# Patient Record
Sex: FEMALE | Race: BLACK OR AFRICAN AMERICAN | Employment: FULL TIME | URBAN - NONMETROPOLITAN AREA
[De-identification: names, ages, dates, MRNs, and addresses within clinical notes are randomized per-mention and may not be internally consistent; named-entity substitution may affect disease eponyms.]

---

## 2022-05-04 ENCOUNTER — APPOINTMENT (OUTPATIENT)
Dept: GENERAL RADIOLOGY | Age: 37
End: 2022-05-04
Attending: EMERGENCY MEDICINE
Payer: MEDICAID

## 2022-05-04 ENCOUNTER — HOSPITAL ENCOUNTER (EMERGENCY)
Age: 37
Discharge: HOME | End: 2022-05-04
Attending: EMERGENCY MEDICINE
Payer: MEDICAID

## 2022-05-04 VITALS
RESPIRATION RATE: 16 BRPM | TEMPERATURE: 98.2 F | SYSTOLIC BLOOD PRESSURE: 133 MMHG | BODY MASS INDEX: 51.91 KG/M2 | WEIGHT: 293 LBS | HEIGHT: 63 IN | OXYGEN SATURATION: 100 % | DIASTOLIC BLOOD PRESSURE: 65 MMHG | HEART RATE: 70 BPM
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
## 2022-05-04 DIAGNOSIS — S01.81XA FACIAL LACERATION, INITIAL ENCOUNTER: ICD-10-CM

## 2022-05-04 DIAGNOSIS — S80.02XA CONTUSION OF LEFT KNEE, INITIAL ENCOUNTER: ICD-10-CM

## 2022-05-04 DIAGNOSIS — S60.221A CONTUSION OF RIGHT HAND, INITIAL ENCOUNTER: Primary | ICD-10-CM

## 2022-05-04 PROCEDURE — 74011000250 HC RX REV CODE- 250: Performed by: EMERGENCY MEDICINE

## 2022-05-04 PROCEDURE — 74011250637 HC RX REV CODE- 250/637: Performed by: EMERGENCY MEDICINE

## 2022-05-04 PROCEDURE — 73130 X-RAY EXAM OF HAND: CPT

## 2022-05-04 PROCEDURE — 90715 TDAP VACCINE 7 YRS/> IM: CPT | Performed by: EMERGENCY MEDICINE

## 2022-05-04 PROCEDURE — 99284 EMERGENCY DEPT VISIT MOD MDM: CPT

## 2022-05-04 PROCEDURE — 74011250636 HC RX REV CODE- 250/636: Performed by: EMERGENCY MEDICINE

## 2022-05-04 PROCEDURE — 75810000293 HC SIMP/SUPERF WND  RPR

## 2022-05-04 PROCEDURE — 73560 X-RAY EXAM OF KNEE 1 OR 2: CPT

## 2022-05-04 PROCEDURE — 90471 IMMUNIZATION ADMIN: CPT

## 2022-05-04 RX ORDER — ACETAMINOPHEN AND CODEINE PHOSPHATE 300; 30 MG/1; MG/1
1 TABLET ORAL
Qty: 14 TABLET | Refills: 0 | Status: SHIPPED | OUTPATIENT
Start: 2022-05-04 | End: 2022-05-11

## 2022-05-04 RX ORDER — LIDOCAINE HYDROCHLORIDE 10 MG/ML
10 INJECTION INFILTRATION; PERINEURAL ONCE
Status: COMPLETED | OUTPATIENT
Start: 2022-05-04 | End: 2022-05-04

## 2022-05-04 RX ORDER — HYDROCHLOROTHIAZIDE 25 MG/1
25 TABLET ORAL DAILY
COMMUNITY
Start: 2021-12-13

## 2022-05-04 RX ORDER — BACITRACIN 500 UNIT/G
1 PACKET (EA) TOPICAL
Status: COMPLETED | OUTPATIENT
Start: 2022-05-04 | End: 2022-05-04

## 2022-05-04 RX ORDER — FAMOTIDINE 20 MG/1
20 TABLET, FILM COATED ORAL 2 TIMES DAILY
COMMUNITY
Start: 2021-12-13

## 2022-05-04 RX ORDER — ACETAMINOPHEN AND CODEINE PHOSPHATE 300; 30 MG/1; MG/1
1 TABLET ORAL
Status: COMPLETED | OUTPATIENT
Start: 2022-05-04 | End: 2022-05-04

## 2022-05-04 RX ADMIN — BACITRACIN 1 PACKET: 500 OINTMENT TOPICAL at 20:38

## 2022-05-04 RX ADMIN — ACETAMINOPHEN AND CODEINE PHOSPHATE 1 TABLET: 300; 30 TABLET ORAL at 19:31

## 2022-05-04 RX ADMIN — TETANUS TOXOID, REDUCED DIPHTHERIA TOXOID AND ACELLULAR PERTUSSIS VACCINE, ADSORBED 0.5 ML: 5; 2.5; 8; 8; 2.5 SUSPENSION INTRAMUSCULAR at 19:32

## 2022-05-04 RX ADMIN — LIDOCAINE HYDROCHLORIDE 10 ML: 10 INJECTION, SOLUTION INFILTRATION; PERINEURAL at 19:57

## 2022-05-04 NOTE — ED TRIAGE NOTES
Pt states she tripped an fell about 4 hours ago, states she is having left knee pain and right thumb pain. Pt also has small laceration above left eyebrow that is still oozing blood.

## 2022-05-04 NOTE — ED PROVIDER NOTES
Pt c/o fall, tripped on a cord, about 6 hours pta, hit face, c/l small lac left forehead, and mild rt thumb and left knee pain. No loc, no headache. No n/v. No cp or abd pain. No neck or back pain. Took tylenol about 5 hours ago, no sig change in pain. No urinary changes, no chance of curr pregnancy. No syncope. Unsure last tetanus. History reviewed. No pertinent past medical history. History reviewed. No pertinent surgical history. History reviewed. No pertinent family history. Social History     Socioeconomic History    Marital status:      Spouse name: Not on file    Number of children: Not on file    Years of education: Not on file    Highest education level: Not on file   Occupational History    Not on file   Tobacco Use    Smoking status: Never Smoker    Smokeless tobacco: Never Used   Substance and Sexual Activity    Alcohol use: Not on file    Drug use: Not on file    Sexual activity: Not on file   Other Topics Concern    Not on file   Social History Narrative    Not on file     Social Determinants of Health     Financial Resource Strain:     Difficulty of Paying Living Expenses: Not on file   Food Insecurity:     Worried About Running Out of Food in the Last Year: Not on file    Judith of Food in the Last Year: Not on file   Transportation Needs:     Lack of Transportation (Medical): Not on file    Lack of Transportation (Non-Medical):  Not on file   Physical Activity:     Days of Exercise per Week: Not on file    Minutes of Exercise per Session: Not on file   Stress:     Feeling of Stress : Not on file   Social Connections:     Frequency of Communication with Friends and Family: Not on file    Frequency of Social Gatherings with Friends and Family: Not on file    Attends Episcopalian Services: Not on file    Active Member of Clubs or Organizations: Not on file    Attends Club or Organization Meetings: Not on file    Marital Status: Not on file Intimate Partner Violence:     Fear of Current or Ex-Partner: Not on file    Emotionally Abused: Not on file    Physically Abused: Not on file    Sexually Abused: Not on file   Housing Stability:     Unable to Pay for Housing in the Last Year: Not on file    Number of [place] in the Last Year: Not on file    Unstable Housing in the Last Year: Not on file         ALLERGIES: Patient has no known allergies. Review of Systems   Constitutional: Negative for fever. Respiratory: Negative for shortness of breath. Cardiovascular: Negative for chest pain. Gastrointestinal: Negative for abdominal pain and vomiting. Musculoskeletal: Negative for back pain and neck pain. Skin: Positive for wound. Neurological: Negative for light-headedness. All other systems reviewed and are negative. Vitals:    05/04/22 1834   BP: 137/62   Pulse: 73   Resp: 16   Temp: 98.2 °F (36.8 °C)   SpO2: 99%   Weight: 140.6 kg (310 lb)   Height: 5' 3\" (1.6 m)            Physical Exam  Vitals and nursing note reviewed. Constitutional:       Appearance: She is well-developed. She is not diaphoretic. HENT:      Head: Normocephalic. Comments: + 3 cm linear lac left forehead in lateral eyebrow. No bleeding. No surr ttp or swelling  Eyes:      Pupils: Pupils are equal, round, and reactive to light. Cardiovascular:      Rate and Rhythm: Normal rate and regular rhythm. Heart sounds: No murmur heard. Pulmonary:      Effort: Pulmonary effort is normal.      Breath sounds: No wheezing. Abdominal:      Palpations: Abdomen is soft. Tenderness: There is no abdominal tenderness. Musculoskeletal:         General: Tenderness present. Cervical back: Normal range of motion. No rigidity or tenderness. Comments: + left mid inferior/lat knee ttp. From . Stable through stress testing  Nvi.   +rt hand ttp base of 1st digit. From. No swelling no erythema   Skin:     General: Skin is dry.       Capillary Refill: Capillary refill takes less than 2 seconds. Findings: No rash. Neurological:      Mental Status: She is alert and oriented to person, place, and time. Psychiatric:         Mood and Affect: Mood normal.          MDM       Wound Repair    Date/Time: 5/4/2022 8:18 PM  Performed by: attendingPreparation: skin prepped with Shur-Clens  Pre-procedure re-eval: Immediately prior to the procedure, the patient was reevaluated and found suitable for the planned procedure and any planned medications. Time out: Immediately prior to the procedure a time out was called to verify the correct patient, procedure, equipment, staff and marking as appropriate. .  Location details: face  Wound length:2.6 - 7.5 cm  Anesthesia: local infiltration    Anesthesia:  Local Anesthetic: lidocaine 1% without epinephrine  Anesthetic total: 3 mL  Foreign bodies: no foreign bodies  Irrigation solution: saline  Irrigation method: syringe  Debridement: minimal  Skin closure: Prolene (6-0)  Number of sutures: 6  Technique: simple and interrupted  Approximation: close  Dressing: antibiotic ointment  Patient tolerance: patient tolerated the procedure well with no immediate complications  My total time at bedside, performing this procedure was 16-30 minutes. Vitals:  Patient Vitals for the past 12 hrs:   Temp Pulse Resp BP SpO2   05/04/22 1834 98.2 °F (36.8 °C) 73 16 137/62 99 %         Medications ordered:   Medications   acetaminophen-codeine (TYLENOL #3) per tablet 1 Tablet (1 Tablet Oral Given 5/4/22 1931)   diph,Pertuss(AC),Tet Vac-PF (BOOSTRIX) suspension 0.5 mL (0.5 mL IntraMUSCular Given 5/4/22 1932)   lidocaine (XYLOCAINE) 10 mg/mL (1 %) injection 10 mL (10 mL SubCUTAneous Given 5/4/22 1957)         Lab findings:  No results found for this or any previous visit (from the past 12 hour(s)). X-Ray, CT or other radiology findings or impressions:  XR KNEE LT MAX 2 VWS   Final Result      1. No fracture or dislocation. XR HAND RT MIN 3 V   Final Result      1. No fracture or dislocation. Progress notes, Consult notes or additional Procedure notes:   8:17 PM no fx, wound closed w good approx. Declines ace for knee. Splint to rt hand. No emc. Stable for dc and close f/u. Det ret inst given. Diagnosis:   1. Contusion of right hand, initial encounter    2. Contusion of left knee, initial encounter    3. Fall, initial encounter    4. Facial laceration, initial encounter        Disposition: home    Follow-up Information     Follow up With Specialties Details Why Contact Info    [facility] EMERGENCY DEPT Emergency Medicine Go to  As needed, If symptoms worsen [facility] [address]    Sidra Young MD Internal Medicine Physician   [address] [facility]  Floor 1  [address] [facility]  [phone]             Patient's Medications   Start Taking    ACETAMINOPHEN-CODEINE (TYLENOL-CODEINE #3) 300-30 MG PER TABLET    Take 1 Tablet by mouth every six (6) hours as needed for Pain for up to 7 days. Max Daily Amount: 4 Tablets. Continue Taking    FAMOTIDINE (PEPCID) 20 MG TABLET    Take 20 mg by mouth two (2) times a day. HYDROCHLOROTHIAZIDE (HYDRODIURIL) 25 MG TABLET    Take 25 mg by mouth daily.    These Medications have changed    No medications on file   Stop Taking    No medications on file

## 2022-05-04 NOTE — ED PROVIDER NOTES
Pt c/o fall, tripped on a cord, about 6 hours pta, hit face, c/l small lac left forehead, and mild rt thumb and left knee pain. No loc, no headache. No n/v. No cp or abd pain. No neck or back pain. Took tylenol about 5 hours ago, no sig change in pain. No urinary changes, no chance of curr pregnancy. No syncope. Unsure last tetanus. History reviewed. No pertinent past medical history. History reviewed. No pertinent surgical history. History reviewed. No pertinent family history. Social History     Socioeconomic History    Marital status:      Spouse name: Not on file    Number of children: Not on file    Years of education: Not on file    Highest education level: Not on file   Occupational History    Not on file   Tobacco Use    Smoking status: Never Smoker    Smokeless tobacco: Never Used   Substance and Sexual Activity    Alcohol use: Not on file    Drug use: Not on file    Sexual activity: Not on file   Other Topics Concern    Not on file   Social History Narrative    Not on file     Social Determinants of Health     Financial Resource Strain:     Difficulty of Paying Living Expenses: Not on file   Food Insecurity:     Worried About Running Out of Food in the Last Year: Not on file    Judith of Food in the Last Year: Not on file   Transportation Needs:     Lack of Transportation (Medical): Not on file    Lack of Transportation (Non-Medical):  Not on file   Physical Activity:     Days of Exercise per Week: Not on file    Minutes of Exercise per Session: Not on file   Stress:     Feeling of Stress : Not on file   Social Connections:     Frequency of Communication with Friends and Family: Not on file    Frequency of Social Gatherings with Friends and Family: Not on file    Attends Taoism Services: Not on file    Active Member of Clubs or Organizations: Not on file    Attends Club or Organization Meetings: Not on file    Marital Status: Not on file Intimate Partner Violence:     Fear of Current or Ex-Partner: Not on file    Emotionally Abused: Not on file    Physically Abused: Not on file    Sexually Abused: Not on file   Housing Stability:     Unable to Pay for Housing in the Last Year: Not on file    Number of [place] in the Last Year: Not on file    Unstable Housing in the Last Year: Not on file         ALLERGIES: Patient has no known allergies. Review of Systems   Constitutional: Negative for fever. Respiratory: Negative for shortness of breath. Cardiovascular: Negative for chest pain. Gastrointestinal: Negative for abdominal pain and vomiting. Musculoskeletal: Negative for back pain and neck pain. Skin: Positive for wound. Neurological: Negative for light-headedness. All other systems reviewed and are negative. Vitals:    05/04/22 1834   BP: 137/62   Pulse: 73   Resp: 16   Temp: 98.2 °F (36.8 °C)   SpO2: 99%   Weight: 140.6 kg (310 lb)   Height: 5' 3\" (1.6 m)            Physical Exam  Vitals and nursing note reviewed. Constitutional:       Appearance: She is well-developed. She is not diaphoretic. HENT:      Head: Normocephalic. Comments: + 3 cm linear lac left forehead in lateral eyebrow. No bleeding. No surr ttp or swelling  Eyes:      Pupils: Pupils are equal, round, and reactive to light. Cardiovascular:      Rate and Rhythm: Normal rate and regular rhythm. Heart sounds: No murmur heard. Pulmonary:      Effort: Pulmonary effort is normal.      Breath sounds: No wheezing. Abdominal:      Palpations: Abdomen is soft. Tenderness: There is no abdominal tenderness. Musculoskeletal:         General: Tenderness present. Cervical back: Normal range of motion. No rigidity or tenderness. Comments: + left mid inferior/lat knee ttp. From . Stable through stress testing  Nvi.   +rt hand ttp base of 1st digit. From. No swelling no erythema   Skin:     General: Skin is dry.       Capillary Refill: Capillary refill takes less than 2 seconds. Findings: No rash. Neurological:      Mental Status: She is alert and oriented to person, place, and time. Psychiatric:         Mood and Affect: Mood normal.          MDM       Wound Repair    Date/Time: 5/4/2022 8:18 PM  Performed by: attendingPreparation: skin prepped with Shur-Clens  Pre-procedure re-eval: Immediately prior to the procedure, the patient was reevaluated and found suitable for the planned procedure and any planned medications. Time out: Immediately prior to the procedure a time out was called to verify the correct patient, procedure, equipment, staff and marking as appropriate. .  Location details: face  Wound length:2.6 - 7.5 cm  Anesthesia: local infiltration    Anesthesia:  Local Anesthetic: lidocaine 1% without epinephrine  Anesthetic total: 3 mL  Foreign bodies: no foreign bodies  Irrigation solution: saline  Irrigation method: syringe  Debridement: minimal  Skin closure: Prolene (6-0)  Number of sutures: 6  Technique: simple and interrupted  Approximation: close  Dressing: antibiotic ointment  Patient tolerance: patient tolerated the procedure well with no immediate complications  My total time at bedside, performing this procedure was 16-30 minutes. Vitals:  Patient Vitals for the past 12 hrs:   Temp Pulse Resp BP SpO2   05/04/22 1834 98.2 °F (36.8 °C) 73 16 137/62 99 %         Medications ordered:   Medications   acetaminophen-codeine (TYLENOL #3) per tablet 1 Tablet (1 Tablet Oral Given 5/4/22 1931)   diph,Pertuss(AC),Tet Vac-PF (BOOSTRIX) suspension 0.5 mL (0.5 mL IntraMUSCular Given 5/4/22 1932)   lidocaine (XYLOCAINE) 10 mg/mL (1 %) injection 10 mL (10 mL SubCUTAneous Given 5/4/22 1957)         Lab findings:  No results found for this or any previous visit (from the past 12 hour(s)). X-Ray, CT or other radiology findings or impressions:  XR KNEE LT MAX 2 VWS   Final Result      1. No fracture or dislocation. XR HAND RT MIN 3 V   Final Result      1. No fracture or dislocation. Progress notes, Consult notes or additional Procedure notes:   8:17 PM no fx, wound closed w good approx. Declines ace for knee. Splint to rt hand. No emc. Stable for dc and close f/u. Det ret inst given. Diagnosis:   1. Contusion of right hand, initial encounter    2. Contusion of left knee, initial encounter    3. Fall, initial encounter    4. Facial laceration, initial encounter        Disposition: home    Follow-up Information     Follow up With Specialties Details Why Contact Info    [facility] EMERGENCY DEPT Emergency Medicine Go to  As needed, If symptoms worsen [facility] [address]    Jim Bell MD Internal Medicine Physician   [address] [facility]  Floor 1  [address] [facility]  [phone]             Patient's Medications   Start Taking    ACETAMINOPHEN-CODEINE (TYLENOL-CODEINE #3) 300-30 MG PER TABLET    Take 1 Tablet by mouth every six (6) hours as needed for Pain for up to 7 days. Max Daily Amount: 4 Tablets. Continue Taking    FAMOTIDINE (PEPCID) 20 MG TABLET    Take 20 mg by mouth two (2) times a day. HYDROCHLOROTHIAZIDE (HYDRODIURIL) 25 MG TABLET    Take 25 mg by mouth daily.    These Medications have changed    No medications on file   Stop Taking    No medications on file

## 2022-05-05 NOTE — DISCHARGE INSTRUCTIONS
Sutures out in 5 days. Return for pain, bleeding/swelling/redness/drainage, fever not resolving with motrin or tylenol, shortness of breath, vomiting, decreased fluid intake, weakness, numbness, dizziness, or any change or concerns.

## 2022-05-10 ENCOUNTER — HOSPITAL ENCOUNTER (EMERGENCY)
Age: 37
Discharge: HOME | End: 2022-05-11
Attending: EMERGENCY MEDICINE
Payer: MEDICAID

## 2022-05-10 DIAGNOSIS — S01.81XD FACIAL LACERATION, SUBSEQUENT ENCOUNTER: Primary | ICD-10-CM

## 2022-05-10 PROCEDURE — 99283 EMERGENCY DEPT VISIT LOW MDM: CPT

## 2022-05-10 PROCEDURE — 75810000293 HC SIMP/SUPERF WND  RPR

## 2022-05-11 VITALS
DIASTOLIC BLOOD PRESSURE: 81 MMHG | WEIGHT: 293 LBS | SYSTOLIC BLOOD PRESSURE: 145 MMHG | HEART RATE: 70 BPM | BODY MASS INDEX: 51.91 KG/M2 | HEIGHT: 63 IN | OXYGEN SATURATION: 99 % | RESPIRATION RATE: 16 BRPM | TEMPERATURE: 98.5 F

## 2022-05-11 PROCEDURE — 74011250637 HC RX REV CODE- 250/637: Performed by: EMERGENCY MEDICINE

## 2022-05-11 PROCEDURE — 75810000293 HC SIMP/SUPERF WND  RPR

## 2022-05-11 PROCEDURE — 74011000250 HC RX REV CODE- 250: Performed by: EMERGENCY MEDICINE

## 2022-05-11 RX ORDER — CEPHALEXIN 250 MG/1
250 CAPSULE ORAL 4 TIMES DAILY
Qty: 20 CAPSULE | Refills: 0 | Status: SHIPPED | OUTPATIENT
Start: 2022-05-11 | End: 2022-05-16

## 2022-05-11 RX ORDER — CEPHALEXIN 250 MG/1
500 CAPSULE ORAL
Status: COMPLETED | OUTPATIENT
Start: 2022-05-11 | End: 2022-05-11

## 2022-05-11 RX ORDER — LIDOCAINE HYDROCHLORIDE 10 MG/ML
10 INJECTION INFILTRATION; PERINEURAL ONCE
Status: COMPLETED | OUTPATIENT
Start: 2022-05-11 | End: 2022-05-11

## 2022-05-11 RX ORDER — BACITRACIN 500 UNIT/G
1 PACKET (EA) TOPICAL
Status: COMPLETED | OUTPATIENT
Start: 2022-05-11 | End: 2022-05-11

## 2022-05-11 RX ADMIN — LIDOCAINE HYDROCHLORIDE 10 ML: 10 INJECTION, SOLUTION INFILTRATION; PERINEURAL at 00:24

## 2022-05-11 RX ADMIN — CEPHALEXIN 500 MG: 250 CAPSULE ORAL at 01:14

## 2022-05-11 RX ADMIN — BACITRACIN 1 PACKET: 500 OINTMENT TOPICAL at 01:14

## 2022-05-11 NOTE — DISCHARGE INSTRUCTIONS
Return for new or worsening pain, redness/drainage/swelling, fever not resolving with motrin or tylenol, shortness of breath, vomiting, decreased fluid intake, weakness, numbness, dizziness, or any change or concerns. 2 sutures need to be removed within 5 days.

## 2022-05-11 NOTE — ED PROVIDER NOTES
Pt c/o wound evaluation, says 6 sutures placed left forehead 7 days ago, 3 days ago, 2 sutures opened up inner portion of wound. Went to pcp today, told needs reevalation. Mild pain at site, continues. No drainage,no redness no new injury. No other complaints. Past Medical History:   Diagnosis Date    GERD (gastroesophageal reflux disease)        History reviewed. No pertinent surgical history. History reviewed. No pertinent family history. Social History     Socioeconomic History    Marital status:      Spouse name: Not on file    Number of children: Not on file    Years of education: Not on file    Highest education level: Not on file   Occupational History    Not on file   Tobacco Use    Smoking status: Never Smoker    Smokeless tobacco: Never Used   Substance and Sexual Activity    Alcohol use: Not Currently    Drug use: Not on file    Sexual activity: Not on file   Other Topics Concern    Not on file   Social History Narrative    Not on file     Social Determinants of Health     Financial Resource Strain:     Difficulty of Paying Living Expenses: Not on file   Food Insecurity:     Worried About Running Out of Food in the Last Year: Not on file    Judith of Food in the Last Year: Not on file   Transportation Needs:     Lack of Transportation (Medical): Not on file    Lack of Transportation (Non-Medical):  Not on file   Physical Activity:     Days of Exercise per Week: Not on file    Minutes of Exercise per Session: Not on file   Stress:     Feeling of Stress : Not on file   Social Connections:     Frequency of Communication with Friends and Family: Not on file    Frequency of Social Gatherings with Friends and Family: Not on file    Attends Buddhism Services: Not on file    Active Member of Clubs or Organizations: Not on file    Attends Club or Organization Meetings: Not on file    Marital Status: Not on file   Intimate Partner Violence:     Fear of Current or Ex-Partner: Not on file    Emotionally Abused: Not on file    Physically Abused: Not on file    Sexually Abused: Not on file   Housing Stability:     Unable to Pay for Housing in the Last Year: Not on file    Number of Places Lived in the Last Year: Not on file    Unstable Housing in the Last Year: Not on file         ALLERGIES: Patient has no known allergies. Review of Systems   Constitutional: Negative for fever. Gastrointestinal: Negative for nausea. Skin: Positive for wound. All other systems reviewed and are negative. Vitals:    05/11/22 0000 05/11/22 0101   BP: (!) 148/87 (!) 145/81   Pulse: 75 70   Resp: 16 16   Temp: 98.5 °F (36.9 °C)    SpO2: 99% 99%   Weight: 140.6 kg (310 lb)    Height: 5' 3\" (1.6 m)             Physical Exam  Vitals and nursing note reviewed. Constitutional:       Appearance: She is well-developed. She is not diaphoretic. HENT:      Head: Normocephalic. Comments: 3 cm wound left forehead just inf to eyebrow, no bleeding/erythema/drainage/warmth  Minimal 1-2 mm dehiscence w 5 sutures in place. Eyes:      Conjunctiva/sclera: Conjunctivae normal.   Pulmonary:      Effort: Pulmonary effort is normal.   Musculoskeletal:         General: Normal range of motion. Cervical back: Normal range of motion. Skin:     General: Skin is dry. Neurological:      General: No focal deficit present. Mental Status: She is alert. Psychiatric:         Mood and Affect: Mood normal.          MDM       Wound Repair    Date/Time: 5/11/2022 1:05 AM  Performed by: attendingPreparation: skin prepped with Jyoti-Reji  Pre-procedure re-eval: Immediately prior to the procedure, the patient was reevaluated and found suitable for the planned procedure and any planned medications. Time out: Immediately prior to the procedure a time out was called to verify the correct patient, procedure, equipment, staff and marking as appropriate. .  Location details: face  Wound length:2.6 - 7.5 cm  Anesthesia: local infiltration    Anesthesia:  Local Anesthetic: lidocaine 1% without epinephrine  Anesthetic total: 1 mL  Foreign bodies: no foreign bodies  Irrigation solution: saline  Debridement: none  Skin closure: 6-0 nylon  Number of sutures: 2  Technique: simple and interrupted  Approximation: close  Dressing: antibiotic ointment  My total time at bedside, performing this procedure was 16-30 minutes. Vitals:  No data found. Medications ordered:   Medications   lidocaine (XYLOCAINE) 10 mg/mL (1 %) injection 10 mL (10 mL IntraDERMal Given 5/11/22 0024)   cephALEXin (KEFLEX) capsule 500 mg (500 mg Oral Given 5/11/22 0114)   bacitracin 500 unit/gram packet 1 Packet (1 Packet Topical Given 5/11/22 0114)         Lab findings:  No results found for this or any previous visit (from the past 12 hour(s)). X-Ray, CT or other radiology findings or impressions:  No orders to display             Progress notes, Consult notes or additional Procedure notes:   Minimal dehischence, 6 sutures removed. 2 nylon sutures placed w good approximation, abx added. Plastic referral given prn. No emc. Stable for dc and close f/u. Det ret inst igven. Diagnosis:   1.  Facial laceration, subsequent encounter        Disposition: home    Follow-up Information     Follow up With Specialties Details Why Contact [facility] EMERGENCY DEPT Emergency Medicine Go to  As needed, If symptoms worsen [facility] [address] [address] [phone]    Tao Melton MD Internal Medicine Physician   [address] [facility]  Floor 1  [address] [address] [facility]      Shari Rajan MD Plastic Surgery  or dr David Shen, As needed [address] [facility]  [phone]      Lauro Moulton MD Plastic Surgery  As needed [address]  [address] [facility]  [phone]             Discharge Medication List as of 5/11/2022  1:04 AM      START taking these medications    Details   cephALEXin (Keflex) 250 mg capsule Take 1 Capsule by mouth four (4) times daily for 5 days. , Normal, Disp-20 Capsule, R-0         CONTINUE these medications which have NOT CHANGED    Details   famotidine (PEPCID) 20 mg tablet Take 20 mg by mouth two (2) times a day., Historical Med      hydroCHLOROthiazide (HYDRODIURIL) 25 mg tablet Take 25 mg by mouth daily. , Historical Med      acetaminophen-codeine (Tylenol-Codeine #3) 300-30 mg per tablet Take 1 Tablet by mouth every six (6) hours as needed for Pain for up to 7 days.  Max Daily Amount: 4 Tablets., Normal, Disp-14 Tablet, R-0

## 2022-05-11 NOTE — ED TRIAGE NOTES
Patient states she had a laceration to her left eye lid repaired here on 5/4/2022 and she went to her PCP to have sutures removed and Patient states PCP told her she needs to return to the ER for suture removal and for ER doctor to evaluate wound due to a suture that has come out. Patient also reports she still has pain to the left eye.